# Patient Record
Sex: MALE | URBAN - METROPOLITAN AREA
[De-identification: names, ages, dates, MRNs, and addresses within clinical notes are randomized per-mention and may not be internally consistent; named-entity substitution may affect disease eponyms.]

---

## 2017-02-10 ENCOUNTER — IMPORTED ENCOUNTER (OUTPATIENT)
Dept: URBAN - METROPOLITAN AREA CLINIC 1 | Facility: CLINIC | Age: 57
End: 2017-02-10

## 2017-02-10 PROBLEM — H25.813: Noted: 2017-02-10

## 2017-02-10 PROBLEM — H04.123: Noted: 2017-02-10

## 2017-02-10 PROBLEM — H17.821: Noted: 2017-02-10

## 2017-02-10 PROBLEM — H16.143: Noted: 2017-02-10

## 2017-02-10 PROCEDURE — 92014 COMPRE OPH EXAM EST PT 1/>: CPT

## 2017-02-10 NOTE — PATIENT DISCUSSION
1.  Cataract OU: Observe for now without intervention. The patient was advised to contact us if any change or worsening of vision2. POLY w/ PEK OU- Recommend ATs BID OU routinely (sample of Systane Balance given) 3. Peripheral Corneal Scar OD Patient defers the refraction at today's visitReturn for an appointment in 1-2 years 27 with Dr. Jarrett Ayon.

## 2017-07-03 ENCOUNTER — IMPORTED ENCOUNTER (OUTPATIENT)
Dept: URBAN - METROPOLITAN AREA CLINIC 1 | Facility: CLINIC | Age: 57
End: 2017-07-03

## 2017-07-03 PROCEDURE — 92015 DETERMINE REFRACTIVE STATE: CPT

## 2022-04-02 ASSESSMENT — VISUAL ACUITY
OD_CC: J1
OD_SC: 20/20
OD_CC: J1
OD_SC: 20/20
OS_SC: 20/20
OS_CC: J1
OS_CC: J1
OS_SC: 20/20

## 2022-04-02 ASSESSMENT — TONOMETRY
OS_IOP_MMHG: 15
OD_IOP_MMHG: 15